# Patient Record
Sex: MALE | Race: WHITE | Employment: FULL TIME | URBAN - NONMETROPOLITAN AREA
[De-identification: names, ages, dates, MRNs, and addresses within clinical notes are randomized per-mention and may not be internally consistent; named-entity substitution may affect disease eponyms.]

---

## 2021-02-05 ENCOUNTER — NURSE ONLY (OUTPATIENT)
Dept: LAB | Age: 51
End: 2021-02-05

## 2022-10-02 ENCOUNTER — HOSPITAL ENCOUNTER (EMERGENCY)
Age: 52
Discharge: HOME | End: 2022-10-02
Attending: EMERGENCY MEDICINE
Payer: COMMERCIAL

## 2022-10-02 VITALS
TEMPERATURE: 98.2 F | SYSTOLIC BLOOD PRESSURE: 142 MMHG | OXYGEN SATURATION: 96 % | RESPIRATION RATE: 15 BRPM | HEART RATE: 72 BPM | DIASTOLIC BLOOD PRESSURE: 99 MMHG

## 2022-10-02 DIAGNOSIS — S05.02XA CORNEAL ABRASION, LEFT, INITIAL ENCOUNTER: Primary | ICD-10-CM

## 2022-10-02 PROCEDURE — 6370000000 HC RX 637 (ALT 250 FOR IP): Performed by: EMERGENCY MEDICINE

## 2022-10-02 PROCEDURE — 99283 EMERGENCY DEPT VISIT LOW MDM: CPT

## 2022-10-02 RX ORDER — ERYTHROMYCIN 5 MG/G
OINTMENT OPHTHALMIC ONCE
Status: COMPLETED | OUTPATIENT
Start: 2022-10-02 | End: 2022-10-02

## 2022-10-02 RX ADMIN — ERYTHROMYCIN: 5 OINTMENT OPHTHALMIC at 17:54

## 2022-10-02 ASSESSMENT — ENCOUNTER SYMPTOMS
EYE PAIN: 1
EYE DISCHARGE: 1
PHOTOPHOBIA: 1
EYE REDNESS: 1
BLURRED VISION: 0

## 2022-10-02 ASSESSMENT — PAIN SCALES - GENERAL: PAINLEVEL_OUTOF10: 7

## 2022-10-02 ASSESSMENT — PAIN DESCRIPTION - LOCATION: LOCATION: EYE

## 2022-10-02 ASSESSMENT — PAIN DESCRIPTION - ORIENTATION: ORIENTATION: LEFT

## 2022-10-02 ASSESSMENT — VISUAL ACUITY
OU: 20/20
OS: 20/30
OD: 20/40

## 2022-10-02 ASSESSMENT — PAIN - FUNCTIONAL ASSESSMENT: PAIN_FUNCTIONAL_ASSESSMENT: 0-10

## 2022-10-02 NOTE — ED TRIAGE NOTES
Pt arrival to the ER with complaint of left eye redness swelling and possible grass in his eye. Patient was mowing without glasses on and tried to take a shower to flush his eye and has had no relief. Patient denies vision changes. Denies wearing contacts. Patient has glasses on. Patient is alert and oriented and breathing with ease. Patient drove himself.

## 2022-10-02 NOTE — DISCHARGE INSTRUCTIONS
Apply the erythromycin ointment to your left lower lid 4 times a day. Over the counter pain medication as needed. Wear dark glasses to protect your eyes from light as needed. Follow-up with an eye care professional as soon as possible.

## 2022-10-03 NOTE — ED PROVIDER NOTES
[facility]  eMERGENCY dEPARTMENT eNCOUnter             Faisal Sheriff 19 COMPLAINT    Chief Complaint   Patient presents with    Foreign Body in Eye     Left eye (possible grass)       Nurses Notes reviewed and I agree except as noted in the HPI. HPI    Belgica Rueda is a 46 y.o. male who presents stating that earlier today he was mowing and a tree branch whipped across his eye. It felt something was in it, so he irrigated the eye. The left eye is still painful laterally. Some photophobia, watery discharge, no change in vision. Pain is 7/10, burning and stinging. Immunizations up to date. He is supposed to wear glasses, but was not wearing them at the time of injury. REVIEW OF SYSTEMS      Review of Systems   Constitutional: Negative. HENT:  Negative for congestion. Eyes:  Positive for photophobia, pain, discharge (watery since injury) and redness. Negative for blurred vision. Neurological:  Negative for dizziness and headaches. All other systems reviewed and are negative. PAST MEDICAL HISTORY     has no past medical history on file. SURGICAL HISTORY     has no past surgical history on file. CURRENT MEDICATIONS    There are no discharge medications for this patient. ALLERGIES    has No Known Allergies. FAMILY HISTORY    has no family status information on file. family history is not on file. SOCIAL HISTORY     reports that he has never smoked. He has never been exposed to tobacco smoke. He has never used smokeless tobacco. He reports that he does not currently use alcohol. He reports that he does not use drugs. PHYSICAL EXAM       INITIAL VITALS: BP (!) 142/99   Pulse 72   Temp 98.2 °F (36.8 °C) (Temporal)   Resp 15   SpO2 96%      Physical Exam  Vitals and nursing note reviewed. Constitutional:       Appearance: He is not toxic-appearing. HENT:      Head: Atraumatic.       Nose: Nose normal.   Eyes:      Extraocular Movements: Extraocular movements intact. Pupils: Pupils are equal, round, and reactive to light. Comments: Conjunctivae injected left eye. Fluorescein uptake in three irregular lines over the lateral aspect of the cornea at about 3:00, extending linearly onto the adjacent sclera. No evidence of perforation of globe. No foreign body on the surface of the eye or under the lids with lid eversion. Neurological:      Mental Status: He is alert. Vitals:    Vitals:    10/02/22 1720   BP: (!) 142/99   Pulse: 72   Resp: 15   Temp: 98.2 °F (36.8 °C)   TempSrc: Temporal   SpO2: 96%       Visual acuity 20/20 bilaterally. EMERGENCY DEPARTMENT COURSE:    Left eye irrigated after Fluorescein exam. Erythromycin ointment applied. Advised re wearing dark glasses, ophthalmology follow up recommended. FINAL IMPRESSION      1. Corneal abrasion, left, initial encounter        DISPOSITION/PLAN    DISPOSITION Decision To Discharge 10/02/2022 05:38:19 PM      PATIENT REFERRED TO:    your eye care professional    Schedule an appointment as soon as possible for a visit         DISCHARGE MEDICATIONS:    There are no discharge medications for this patient.          (Please note that portions of this note were completed with a voice recognition program.  Efforts were made to edit the dictations but occasionally words are mis-transcribed.)      Santi Macdonald MD  10/02/22 [id number]